# Patient Record
Sex: MALE | Race: WHITE | Employment: OTHER | URBAN - METROPOLITAN AREA
[De-identification: names, ages, dates, MRNs, and addresses within clinical notes are randomized per-mention and may not be internally consistent; named-entity substitution may affect disease eponyms.]

---

## 2023-12-27 ENCOUNTER — HOSPITAL ENCOUNTER (OUTPATIENT)
Age: 68
Setting detail: THERAPIES SERIES
Discharge: HOME | End: 2023-12-27
Payer: MEDICARE

## 2023-12-27 PROCEDURE — 97110 THERAPEUTIC EXERCISES: CPT | Performed by: PHYSICAL THERAPIST

## 2023-12-27 NOTE — FLOWSHEET NOTE
[] [address]  [address] [facility].  [phone]  F: [phone] [] [address] [facility]  [address]   [address]  P: [phone]  F: [phone] [] [address]  [address] [facility]  P: [phone]  F: [phone] [] [place]: [phone]  F: [phone] [] [address]  [facility]   [address]   P: [phone]  F: [phone]  [x] [address]   P: [phone]  F: [phone] [] [address] [facility]  [address]  P: [phone]  F: [phone] [] [address] [facility]  [address] [facility] [address]  [place]: [phone]  F: [phone] [] [address]  [place]: [phone]  F: [phone]      Physical Therapy Daily Treatment Note    Date:  2023  Patient Name:  Annel Mckeon    :    MRN: [id number]  Physician: Rishi Ibanez MD                                        Insurance: MEDICARE PART A AND B   Medical Diagnosis: B97.668               Rehab Codes: M25.551, M25.651  Onset Date: 6/15/22                                  Next 's appt: TBD  Estimated Medicare charges to 23 : $212  Visit# / total visits: 2/4; Progress note for Medicare patient due at visit end of POC     Cancels/No Shows: 0/0    Subjective:  Feels more \"planted\" and stable when on his feet. Since initiating treatment he voices being very pleased with his improvement and change.   Pain:  [x] Yes  [] No Location: R mid/prox thigh-anterior  Pain

## 2024-01-03 ENCOUNTER — HOSPITAL ENCOUNTER (OUTPATIENT)
Age: 69
Setting detail: THERAPIES SERIES
Discharge: HOME | End: 2024-01-03
Payer: MEDICARE

## 2024-01-03 PROCEDURE — 97110 THERAPEUTIC EXERCISES: CPT | Performed by: PHYSICAL THERAPIST

## 2024-01-03 NOTE — FLOWSHEET NOTE
[] [facility]  Outpatient Rehabilitation &  Therapy  [address]  [phone]  F: [phone] [] [facility]  Outpatient Rehabilitation &  Therapy  [address]   [address]  P: [phone]  F: [phone] [] [facility]  Outpatient Rehabilitation &  Therapy  [address]  P: [phone]  F: [phone] [] [facility]  Outpatient Rehabilitation &  Therapy  [address]  P: [phone]  F: [phone] [] [facility]  Outpatient Rehabilitation &  Therapy  [address]   [address]   P: [phone]  F: [phone]  [x] [facility]  Outpatient Rehabilitation &  Therapy  [address]   P: [phone]  F: [phone] [] [facility]  Outpatient Rehabilitation &  Therapy  [address]  [address]  P: [phone]  F: [phone] [] [facility]  Outpatient Rehabilitation &  Therapy  [address]  P: [phone]  F: [phone] [] [facility]  Outpatient Rehabilitation &  Therapy  [address]  P: [phone]  F: [phone]      Physical Therapy Daily Treatment Note    Date:  1/3/2024  Patient Name:  Willie Rivera    :  1955  MRN: [id number]  Physician: Raheem Redding MD                                        Insurance: MEDICARE PART A AND B   Medical Diagnosis: Z96.641               Rehab Codes: M25.551, M25.651  Onset Date: 6/15/22                                  Next 's appt: 24  Estimated Medicare charges to 24  : $72  Visit# / total visits: 3/4; Progress note for Medicare patient due at visit end of POC     Cancels/No Shows: 0/0    Subjective:  Notes additional improvement from last session experiencing more coordination of his walking. Remains pleased with progress to date.   Pain:  [x] Yes  [] No Location: R mid/prox thigh-anterior  Pain

## 2024-01-10 ENCOUNTER — HOSPITAL ENCOUNTER (OUTPATIENT)
Age: 69
Setting detail: THERAPIES SERIES
Discharge: HOME | End: 2024-01-10
Payer: MEDICARE

## 2024-01-10 PROCEDURE — 97110 THERAPEUTIC EXERCISES: CPT | Performed by: PHYSICAL THERAPIST

## 2024-01-10 NOTE — FLOWSHEET NOTE
intermittent  Pain altered Tx:  [x] No  [] Yes  Action:  Comments: Gait with mild asymmetry favoring R at mid stance    Objective:  Pelvis symmetrical. R hip abd strength 4+/5. R ant hip, prox-mid quad firm and fibrotic. AROM R hip ext 5 degrees, AAROM 10 degrees  Modalities: None  Precautions:  None  Exercises:  Exercise Reps/ Time Weight/ Level Comments   Roll for control      bridging 15x2     Sidelying clamshell 15x2 blue    SLS GT c/cc 20x2     SLS  3 way chops 10x2     Standing quad stretch 30'' x4     Kneeling hip hip flexor stretch 30'' x4                         MANUAL:  MFR R ANTHIP/THIGH      Treatment Charges: Mins Units   []  Modalities     [x]  Ther Exercise 40 3   [x]  Manual Therapy 5 0   []  Ther Activities     []  Neuro Re-ed     []  Vasocompression     [] Gait     []  Other     Total Billable time 45 3       Assessment: [x] Progressing toward goals.    [] No change.     [x] Other:  LE's feel \"alive\" post stretching. Good compliance with all instructions and no adverse reaactions noted.  [x] Patient would continue to benefit from skilled physical therapy services in order to: Achieve neutral pelvis in order to restore R hip ROM, strength to allow pain free and efficient community walking, stairs; and successful long term self management.       STG: (to be met in 4 treatments)  ? Pain: reduce by 50% to self manage with HEP  ? ROM: R hip flexion without pain, rectus fem = L for efficiency in gait  ? Strength: R hip abd, ext 5/5 for efficiency in gait  ? Function: reduce WOMAC to < 25%  Patient to be independent with home exercise program as demonstrated by performance with correct form without cues.       Pt. Education:  [x] Yes  [] No  [x] Reviewed Prior HEP/Ed  Method of Education: [] Verbal  [x] Demo  [x] Written    Comprehension of Education:  [x] Verbalizes understanding.  [x] Demonstrates understanding.  [] Needs review.  [x] Demonstrates/verbalizes HEP/Ed previously given.     Access Code: